# Patient Record
Sex: MALE | Race: WHITE | NOT HISPANIC OR LATINO | Employment: UNEMPLOYED | URBAN - NONMETROPOLITAN AREA
[De-identification: names, ages, dates, MRNs, and addresses within clinical notes are randomized per-mention and may not be internally consistent; named-entity substitution may affect disease eponyms.]

---

## 2019-07-17 ENCOUNTER — OFFICE VISIT (OUTPATIENT)
Dept: UROLOGY | Facility: CLINIC | Age: 69
End: 2019-07-17

## 2019-07-17 VITALS
DIASTOLIC BLOOD PRESSURE: 70 MMHG | SYSTOLIC BLOOD PRESSURE: 130 MMHG | BODY MASS INDEX: 40.56 KG/M2 | HEIGHT: 60 IN | WEIGHT: 206.6 LBS | TEMPERATURE: 97.7 F

## 2019-07-17 DIAGNOSIS — S39.94XA INJURY TO PENIS, INITIAL ENCOUNTER: Primary | ICD-10-CM

## 2019-07-17 DIAGNOSIS — N42.9 DISORDER OF PROSTATE: ICD-10-CM

## 2019-07-17 PROCEDURE — 36415 COLL VENOUS BLD VENIPUNCTURE: CPT | Performed by: UROLOGY

## 2019-07-17 PROCEDURE — 99202 OFFICE O/P NEW SF 15 MIN: CPT | Performed by: UROLOGY

## 2019-07-17 RX ORDER — HYDROCODONE BITARTRATE AND ACETAMINOPHEN 10; 325 MG/1; MG/1
2 TABLET ORAL EVERY 6 HOURS PRN
COMMUNITY

## 2019-07-17 RX ORDER — ASPIRIN 81 MG/1
81 TABLET ORAL DAILY
COMMUNITY

## 2019-07-17 RX ORDER — ALBUTEROL SULFATE 90 UG/1
108 AEROSOL, METERED RESPIRATORY (INHALATION)
COMMUNITY
Start: 2014-05-19

## 2019-07-17 RX ORDER — ALPRAZOLAM 1 MG/1
1 TABLET ORAL EVERY 6 HOURS
COMMUNITY
Start: 2014-07-17

## 2019-07-17 NOTE — PROGRESS NOTES
Chief Complaint:          Penile lesion    HPI:   68 y.o. male.  Pt thinks he may have injury to his penis.  He does not really recall how.  Family hx of prostate cancer.  HPI      Past Medical History:        Past Medical History:   Diagnosis Date   • COPD (chronic obstructive pulmonary disease) (CMS/[facility])    • Coronary artery disease          Current Meds:     Current Outpatient Medications   Medication Sig Dispense Refill   • albuterol sulfate HFA (VENTOLIN HFA) 108 (90 Base) MCG/ACT inhaler 108 mg.     • ALPRAZolam (XANAX) 1 MG tablet Take 1 tablet by mouth Every 6 (Six) Hours.     • aspirin 81 MG EC tablet Take 81 mg by mouth Daily.     • HYDROcodone-acetaminophen (NORCO)  MG per tablet Take 2 tablets by mouth Every 6 (Six) Hours As Needed for Moderate Pain .       No current facility-administered medications for this visit.         Allergies:      No Known Allergies     Past Surgical History:     Past Surgical History:   Procedure Laterality Date   • APPENDECTOMY     • CARDIAC SURGERY           Social History:     Social History     Socioeconomic History   • Marital status:      Spouse name: Not on file   • Number of children: Not on file   • Years of education: Not on file   • Highest education level: Not on file   Tobacco Use   • Smoking status: Former Smoker   • Smokeless tobacco: Current User   Substance and Sexual Activity   • Alcohol use: Yes   • Drug use: No       Family History:     Family History   Problem Relation Age of Onset   • Hypertension Father    • Prostate cancer Father    • Heart disease Mother    • Hypertension Mother        Review of Systems:     Review of Systems   Constitutional: Positive for fatigue.   HENT: Negative for sinus pressure and sinus pain.    Eyes: Negative for pain and redness.   Respiratory: Positive for wheezing. Negative for chest tightness.    Cardiovascular: Negative for chest pain.   Gastrointestinal: Negative for abdominal pain, constipation and diarrhea.    Endocrine: Negative for heat intolerance.   Genitourinary: Negative for frequency.   Musculoskeletal: Negative for back pain.   Skin: Negative for rash.   Allergic/Immunologic: Negative for food allergies.   Neurological: Negative for headaches.   Hematological: Bruises/bleeds easily.         I have reviewed the follow portions of the patient's history and confirmed they are accurate today:  allergies, current medications, past family history, past medical history, past social history, past surgical history, problem list and ROS  Physical Exam:     Physical Exam   Constitutional: He is oriented to person, place, and time.   HENT:   Head: Normocephalic and atraumatic.   Right Ear: External ear normal.   Left Ear: External ear normal.   Nose: Nose normal.   Mouth/Throat: Oropharynx is clear and moist.   Eyes: Conjunctivae and EOM are normal. Pupils are equal, round, and reactive to light.   Neck: Normal range of motion. Neck supple. No thyromegaly present.   Cardiovascular: Normal rate, regular rhythm, normal heart sounds and intact distal pulses.   No murmur heard.  Pulmonary/Chest: Effort normal and breath sounds normal. No respiratory distress. He has no wheezes. He has no rales. He exhibits no tenderness.   Abdominal: Soft. Bowel sounds are normal. He exhibits no distension and no mass. There is no tenderness. No hernia.   Genitourinary: Rectum normal, prostate normal and penis normal.   Genitourinary Comments: On the left side of the glans penis there is a wedge shaped bruise.   Musculoskeletal: Normal range of motion. He exhibits no edema or tenderness.   Lymphadenopathy:     He has no cervical adenopathy.   Neurological: He is alert and oriented to person, place, and time. No cranial nerve deficit. He exhibits normal muscle tone. Coordination normal.   Skin: Skin is warm. No rash noted.   Psychiatric: He has a normal mood and affect. His behavior is normal. Judgment and thought content normal.   Nursing note  "and vitals reviewed.      /70   Temp 97.7 °F (36.5 °C)   Ht (!) 15.2 cm (6\")   Wt 93.7 kg (206 lb 9.6 oz)   BMI 4034.88 kg/m²    Procedure:         Assessment:     Encounter Diagnoses   Name Primary?   • Injury to penis, initial encounter Yes   • Disorder of prostate        Orders Placed This Encounter   Procedures   • PSA DIAGNOSTIC     Standing Status:   Future     Standing Expiration Date:   7/17/2022         Plan:   Will order a psa today and will see pt  Back in 6 weeks and the bruise should resolve      Counseling was given to patient for the following topics impressions as follows: penile bruise. The interim medical history and current results were reviewed.  A treatment plan with follow-up was made for Injury to penis, initial encounter [S39.94XA].   This document has been electronically signed by Clay Roy MD July 17, 2019 4:00 PM      "

## 2019-07-18 LAB — PSA SERPL-MCNC: 0.25 NG/ML (ref 0–4)

## 2019-08-21 ENCOUNTER — OFFICE VISIT (OUTPATIENT)
Dept: UROLOGY | Facility: CLINIC | Age: 69
End: 2019-08-21

## 2019-08-21 VITALS — HEIGHT: 72 IN | WEIGHT: 209.4 LBS | BODY MASS INDEX: 28.36 KG/M2

## 2019-08-21 DIAGNOSIS — S39.94XA INJURY TO PENIS, INITIAL ENCOUNTER: Primary | ICD-10-CM

## 2019-08-21 PROCEDURE — 99213 OFFICE O/P EST LOW 20 MIN: CPT | Performed by: UROLOGY

## 2019-08-21 NOTE — PROGRESS NOTES
Chief Complaint:          Penile bruise.    HPI:   69 y.o. male.  Pt's psa was 0.25  HPI  Pt's penile bruise has resolved.  He is concerned about the blue color at the head of his penisl    Past Medical History:        Past Medical History:   Diagnosis Date   • COPD (chronic obstructive pulmonary disease) (CMS/[facility])    • Coronary artery disease          Current Meds:     Current Outpatient Medications   Medication Sig Dispense Refill   • albuterol sulfate HFA (VENTOLIN HFA) 108 (90 Base) MCG/ACT inhaler 108 mg.     • ALPRAZolam (XANAX) 1 MG tablet Take 1 tablet by mouth Every 6 (Six) Hours.     • aspirin 81 MG EC tablet Take 81 mg by mouth Daily.     • HYDROcodone-acetaminophen (NORCO)  MG per tablet Take 2 tablets by mouth Every 6 (Six) Hours As Needed for Moderate Pain .       No current facility-administered medications for this visit.         Allergies:      No Known Allergies     Past Surgical History:     Past Surgical History:   Procedure Laterality Date   • APPENDECTOMY     • CARDIAC SURGERY           Social History:     Social History     Socioeconomic History   • Marital status:      Spouse name: Not on file   • Number of children: Not on file   • Years of education: Not on file   • Highest education level: Not on file   Tobacco Use   • Smoking status: Former Smoker   • Smokeless tobacco: Current User   Substance and Sexual Activity   • Alcohol use: Yes   • Drug use: No       Family History:     Family History   Problem Relation Age of Onset   • Hypertension Father    • Prostate cancer Father    • Heart disease Mother    • Hypertension Mother        Review of Systems:     Review of Systems   Constitutional: Negative for fatigue.   HENT: Negative for sinus pressure and sinus pain.    Eyes: Negative for pain and redness.   Respiratory: Negative for chest tightness.    Cardiovascular: Negative for chest pain.   Gastrointestinal: Negative for abdominal pain, constipation and diarrhea.   Endocrine:  Negative for heat intolerance.   Genitourinary: Negative for frequency.   Musculoskeletal: Negative for back pain.   Allergic/Immunologic: Negative for food allergies.   Neurological: Negative for headaches.   Hematological: Does not bruise/bleed easily.             I have reviewed the follow portions of the patient's history and confirmed they are accurate today:  allergies, current medications, past family history, past medical history, past social history, past surgical history, problem list and ROS  Physical Exam:     Physical Exam   Constitutional: He is oriented to person, place, and time.   HENT:   Head: Normocephalic and atraumatic.   Right Ear: External ear normal.   Left Ear: External ear normal.   Nose: Nose normal.   Mouth/Throat: Oropharynx is clear and moist.   Eyes: Conjunctivae and EOM are normal. Pupils are equal, round, and reactive to light.   Neck: Normal range of motion. Neck supple. No thyromegaly present.   Cardiovascular: Normal rate, regular rhythm, normal heart sounds and intact distal pulses.   No murmur heard.  Pulmonary/Chest: Effort normal and breath sounds normal. No respiratory distress. He has no wheezes. He has no rales. He exhibits no tenderness.   Abdominal: Soft. Bowel sounds are normal. He exhibits no distension and no mass. There is no tenderness. No hernia.   Genitourinary: Rectum normal, prostate normal and penis normal.   Genitourinary Comments: Pt has slight blueish tint to the left side of corona near meatus.    Musculoskeletal: Normal range of motion. He exhibits no edema or tenderness.   Lymphadenopathy:     He has no cervical adenopathy.   Neurological: He is alert and oriented to person, place, and time. No cranial nerve deficit. He exhibits normal muscle tone. Coordination normal.   Skin: Skin is warm. No rash noted.   Psychiatric: He has a normal mood and affect. His behavior is normal. Judgment and thought content normal.   Nursing note and vitals reviewed.      Rosalba  "182.9 cm (72\")   Wt 95 kg (209 lb 6.4 oz)   BMI 28.40 kg/m²    Procedure:         Assessment:     Encounter Diagnosis   Name Primary?   • Injury to penis, initial encounter Yes       No orders of the defined types were placed in this encounter.      Patient reports that he is not currently experiencing any symptoms of urinary incontinence.      Plan:   Pt could have a vascular malformation that gives his corona a blueish color.  Will observe again in 6 months.    Patient's Body mass index is 28.4 kg/m². BMI is above normal parameters. Recommendations include: educational material.      Smoking Cessation Counseling:  Former smoker.  Patient does not currently use any tobacco products.     Counseling was given to patient for the following topics instructions for management as follows: penile lesion. The interim medical history and current results were reviewed.  A treatment plan with follow-up was made for Injury to penis, initial encounter [S39.94XA].     This document has been electronically signed by Clay Roy MD September 10, 2019 1:35 PM      "

## 2020-07-13 ENCOUNTER — TRANSCRIBE ORDERS (OUTPATIENT)
Dept: INTERNAL MEDICINE | Facility: CLINIC | Age: 70
End: 2020-07-13

## 2020-07-13 ENCOUNTER — HOSPITAL ENCOUNTER (OUTPATIENT)
Dept: GENERAL RADIOLOGY | Facility: HOSPITAL | Age: 70
Discharge: HOME | End: 2020-07-13
Admitting: NURSE PRACTITIONER

## 2020-07-13 DIAGNOSIS — T14.8XXA HEMATOMA: Primary | ICD-10-CM

## 2020-07-13 DIAGNOSIS — T14.8XXA HEMATOMA: ICD-10-CM

## 2020-07-13 PROCEDURE — 99203 OFFICE O/P NEW LOW 30 MIN: CPT | Performed by: NURSE PRACTITIONER

## 2020-07-13 PROCEDURE — 73590 X-RAY EXAM OF LOWER LEG: CPT

## 2020-07-13 PROCEDURE — 73590 X-RAY EXAM OF LOWER LEG: CPT | Performed by: RADIOLOGY

## 2021-02-17 DIAGNOSIS — Z23 IMMUNIZATION DUE: ICD-10-CM

## 2021-11-19 ENCOUNTER — HOSPITAL ENCOUNTER (EMERGENCY)
Facility: HOSPITAL | Age: 71
Discharge: ANOTHER HOSPITAL | End: 2021-11-20
Attending: STUDENT IN AN ORGANIZED HEALTH CARE EDUCATION/TRAINING PROGRAM | Admitting: STUDENT IN AN ORGANIZED HEALTH CARE EDUCATION/TRAINING PROGRAM

## 2021-11-19 ENCOUNTER — APPOINTMENT (OUTPATIENT)
Dept: CT IMAGING | Facility: HOSPITAL | Age: 71
End: 2021-11-19

## 2021-11-19 ENCOUNTER — APPOINTMENT (OUTPATIENT)
Dept: GENERAL RADIOLOGY | Facility: HOSPITAL | Age: 71
End: 2021-11-19

## 2021-11-19 DIAGNOSIS — S01.312A COMPLEX LACERATION OF EAR, LEFT, INITIAL ENCOUNTER: Primary | ICD-10-CM

## 2021-11-19 LAB
ALBUMIN SERPL-MCNC: 4.12 G/DL (ref 3.5–5.2)
ALBUMIN/GLOB SERPL: 1.5 G/DL
ALP SERPL-CCNC: 80 U/L (ref 39–117)
ALT SERPL W P-5'-P-CCNC: 18 U/L (ref 1–41)
ANION GAP SERPL CALCULATED.3IONS-SCNC: 10.9 MMOL/L (ref 5–15)
AST SERPL-CCNC: 22 U/L (ref 1–40)
BASOPHILS # BLD AUTO: 0.04 10*3/MM3 (ref 0–0.2)
BASOPHILS NFR BLD AUTO: 0.7 % (ref 0–1.5)
BILIRUB SERPL-MCNC: 0.2 MG/DL (ref 0–1.2)
BUN SERPL-MCNC: 10 MG/DL (ref 8–23)
BUN/CREAT SERPL: 11.5 (ref 7–25)
CALCIUM SPEC-SCNC: 9.2 MG/DL (ref 8.6–10.5)
CHLORIDE SERPL-SCNC: 104 MMOL/L (ref 98–107)
CO2 SERPL-SCNC: 25.1 MMOL/L (ref 22–29)
CREAT SERPL-MCNC: 0.87 MG/DL (ref 0.76–1.27)
DEPRECATED RDW RBC AUTO: 48.7 FL (ref 37–54)
EOSINOPHIL # BLD AUTO: 0.25 10*3/MM3 (ref 0–0.4)
EOSINOPHIL NFR BLD AUTO: 4.2 % (ref 0.3–6.2)
ERYTHROCYTE [DISTWIDTH] IN BLOOD BY AUTOMATED COUNT: 13.3 % (ref 12.3–15.4)
GFR SERPL CREATININE-BSD FRML MDRD: 86 ML/MIN/1.73
GLOBULIN UR ELPH-MCNC: 2.8 GM/DL
GLUCOSE SERPL-MCNC: 97 MG/DL (ref 65–99)
HCT VFR BLD AUTO: 40.6 % (ref 37.5–51)
HGB BLD-MCNC: 12.8 G/DL (ref 13–17.7)
IMM GRANULOCYTES # BLD AUTO: 0.02 10*3/MM3 (ref 0–0.05)
IMM GRANULOCYTES NFR BLD AUTO: 0.3 % (ref 0–0.5)
INR PPP: 0.92 (ref 0.9–1.1)
LYMPHOCYTES # BLD AUTO: 1.33 10*3/MM3 (ref 0.7–3.1)
LYMPHOCYTES NFR BLD AUTO: 22.4 % (ref 19.6–45.3)
MCH RBC QN AUTO: 31.4 PG (ref 26.6–33)
MCHC RBC AUTO-ENTMCNC: 31.5 G/DL (ref 31.5–35.7)
MCV RBC AUTO: 99.8 FL (ref 79–97)
MONOCYTES # BLD AUTO: 0.63 10*3/MM3 (ref 0.1–0.9)
MONOCYTES NFR BLD AUTO: 10.6 % (ref 5–12)
NEUTROPHILS NFR BLD AUTO: 3.67 10*3/MM3 (ref 1.7–7)
NEUTROPHILS NFR BLD AUTO: 61.8 % (ref 42.7–76)
NRBC BLD AUTO-RTO: 0 /100 WBC (ref 0–0.2)
PLATELET # BLD AUTO: 191 10*3/MM3 (ref 140–450)
PMV BLD AUTO: 11.4 FL (ref 6–12)
POTASSIUM SERPL-SCNC: 3.8 MMOL/L (ref 3.5–5.2)
PROT SERPL-MCNC: 6.9 G/DL (ref 6–8.5)
PROTHROMBIN TIME: 12.8 SECONDS (ref 12.8–14.5)
RBC # BLD AUTO: 4.07 10*6/MM3 (ref 4.14–5.8)
SODIUM SERPL-SCNC: 140 MMOL/L (ref 136–145)
WBC NRBC COR # BLD: 5.94 10*3/MM3 (ref 3.4–10.8)

## 2021-11-19 PROCEDURE — 85610 PROTHROMBIN TIME: CPT | Performed by: STUDENT IN AN ORGANIZED HEALTH CARE EDUCATION/TRAINING PROGRAM

## 2021-11-19 PROCEDURE — 36415 COLL VENOUS BLD VENIPUNCTURE: CPT

## 2021-11-19 PROCEDURE — 99283 EMERGENCY DEPT VISIT LOW MDM: CPT

## 2021-11-19 PROCEDURE — 85025 COMPLETE CBC W/AUTO DIFF WBC: CPT | Performed by: STUDENT IN AN ORGANIZED HEALTH CARE EDUCATION/TRAINING PROGRAM

## 2021-11-19 PROCEDURE — 70450 CT HEAD/BRAIN W/O DYE: CPT

## 2021-11-19 PROCEDURE — 80053 COMPREHEN METABOLIC PANEL: CPT | Performed by: STUDENT IN AN ORGANIZED HEALTH CARE EDUCATION/TRAINING PROGRAM

## 2021-11-19 PROCEDURE — 73562 X-RAY EXAM OF KNEE 3: CPT

## 2021-11-19 RX ORDER — HYDROCODONE BITARTRATE AND ACETAMINOPHEN 5; 325 MG/1; MG/1
1 TABLET ORAL ONCE
Status: DISCONTINUED | OUTPATIENT
Start: 2021-11-19 | End: 2021-11-20 | Stop reason: HOSPADM

## 2021-11-20 VITALS
RESPIRATION RATE: 16 BRPM | TEMPERATURE: 98 F | OXYGEN SATURATION: 98 % | WEIGHT: 206 LBS | BODY MASS INDEX: 27.9 KG/M2 | SYSTOLIC BLOOD PRESSURE: 189 MMHG | HEIGHT: 72 IN | DIASTOLIC BLOOD PRESSURE: 93 MMHG | HEART RATE: 74 BPM

## 2021-11-20 PROCEDURE — 90471 IMMUNIZATION ADMIN: CPT | Performed by: STUDENT IN AN ORGANIZED HEALTH CARE EDUCATION/TRAINING PROGRAM

## 2021-11-20 PROCEDURE — 25010000002 TETANUS-DIPHTH-ACELL PERTUSSIS 5-2.5-18.5 LF-MCG/0.5 SUSPENSION PREFILLED SYRINGE: Performed by: STUDENT IN AN ORGANIZED HEALTH CARE EDUCATION/TRAINING PROGRAM

## 2021-11-20 PROCEDURE — 90715 TDAP VACCINE 7 YRS/> IM: CPT | Performed by: STUDENT IN AN ORGANIZED HEALTH CARE EDUCATION/TRAINING PROGRAM

## 2021-11-20 RX ORDER — AMOXICILLIN AND CLAVULANATE POTASSIUM 875; 125 MG/1; MG/1
1 TABLET, FILM COATED ORAL ONCE
Status: COMPLETED | OUTPATIENT
Start: 2021-11-20 | End: 2021-11-20

## 2021-11-20 RX ADMIN — TETANUS TOXOID, REDUCED DIPHTHERIA TOXOID AND ACELLULAR PERTUSSIS VACCINE, ADSORBED 0.5 ML: 5; 2.5; 8; 8; 2.5 SUSPENSION INTRAMUSCULAR at 00:34

## 2021-11-20 RX ADMIN — AMOXICILLIN AND CLAVULANATE POTASSIUM 1 TABLET: 875; 125 TABLET, FILM COATED ORAL at 00:34

## 2021-11-20 NOTE — ED PROVIDER NOTES
Subjective   71-year-old male presents to the ER after a fall at home.  Patient noted he struck his head on the corner of a bedside table when he was attempting to put his cat back in its cage.  Patient noted immediate pain to the left ear.  Patient also noted left knee pain.  Patient notes considerable left knee pain and swelling after ambulating to the car and transport here.  Denied chest pain or shortness of breath.  Denied headache or vision changes.  Denied focal neurological deficits.  Vitals stable          Review of Systems   HENT: Positive for ear pain.    Musculoskeletal:        Left knee pain   All other systems reviewed and are negative.      Past Medical History:   Diagnosis Date   • COPD (chronic obstructive pulmonary disease) (CMS/HCC)    • Coronary artery disease        No Known Allergies    Past Surgical History:   Procedure Laterality Date   • APPENDECTOMY     • CARDIAC SURGERY         Family History   Problem Relation Age of Onset   • Hypertension Father    • Prostate cancer Father    • Heart disease Mother    • Hypertension Mother        Social History     Socioeconomic History   • Marital status:    Tobacco Use   • Smoking status: Former Smoker   • Smokeless tobacco: Current User   Substance and Sexual Activity   • Alcohol use: Yes   • Drug use: No           Objective   Physical Exam  Constitutional:       General: He is not in acute distress.     Appearance: He is well-developed. He is not ill-appearing.   HENT:      Head: Normocephalic and atraumatic.      Left Ear: Laceration present.      Ears:        Comments: Significant laceration noted to the pinna of the left ear.  Entire length of the superior pinna is involved.  Full-thickness laceration noted.  Laceration extends to within 1 to 2 cm of the external ear canal.  Eyes:      Extraocular Movements: Extraocular movements intact.      Pupils: Pupils are equal, round, and reactive to light.   Neck:      Vascular: No JVD.    Cardiovascular:      Rate and Rhythm: Normal rate and regular rhythm.      Heart sounds: Normal heart sounds. No murmur heard.      Pulmonary:      Effort: No tachypnea, accessory muscle usage or respiratory distress.      Breath sounds: Normal breath sounds. No stridor. No decreased breath sounds, wheezing, rhonchi or rales.   Chest:      Chest wall: No deformity, tenderness or crepitus.   Abdominal:      General: Bowel sounds are normal.      Palpations: Abdomen is soft.      Tenderness: There is no abdominal tenderness. There is no guarding or rebound.   Musculoskeletal:         General: Normal range of motion.      Cervical back: Normal range of motion and neck supple.      Right lower leg: No tenderness. No edema.      Left lower leg: No tenderness. No edema.   Lymphadenopathy:      Cervical: No cervical adenopathy.   Skin:     General: Skin is warm and dry.      Coloration: Skin is not cyanotic.      Findings: No ecchymosis or erythema.   Neurological:      General: No focal deficit present.      Mental Status: He is alert and oriented to person, place, and time.      Cranial Nerves: No cranial nerve deficit.      Motor: No weakness.   Psychiatric:         Mood and Affect: Mood normal. Mood is not anxious.         Behavior: Behavior normal. Behavior is not agitated.         Procedures           ED Course  ED Course as of 11/20/21 0014   Fri Nov 19, 2021   2247 Laceration is significant enough to involve plastics.  [facility] noted they have no beds available.  [facility] noted they had no beds available.  I spoke to [facility] in an attempt to transfer this patient to the ER for evaluation by plastic surgery, patient was initially accepted by the ER attending but he wanted approval from the plastic surgery, I spoke with the plastic surgeon, Dr. Burton, and he expressed the concern for possible ENT involvement.  I spoke with their on-call ENT surgeon, Dr. Coats, and he noted  that he would gladly see the patient in consult but he would not be accepting this patient to his service because this is a plastics concern.  I joseph this with Dr. Burton and he noted that he is not willing and uncomfortable to accept this patient due to concerns for possible ENT involvement.  Transfer status still pending [SF]   2331 Extensive and multiple phone calls have been made to places like [facility], [facility], [facility], [facility], [facility] [place], and other surrounding medical centers with no beds available and no availability for possible ER to ER transfer.  We are currently attempting to contact [facility] in [place] and [facility] in [facility].  We are also attempting to contact [facility] [place] [SF]   7390 We finally received approval for transfer to [facility] [place] to the ER.  Accepting attending will be Dr. Austin.  He was agreeable to transfer plan.  Vital stable transfer [SF]   Sat Nov 20, 2021   0002 Multiple paramedic ribs have been contacted and unable to provide transport for this patient.  Specifically, [facility] service notified our staff that they had trucks available and staff available for transfer.  However, there  felt uncomfortable sending a team to [place] due to concerns for lack of sleep at this point time.  It is likely this patient will need to be transferred to [facility] [place] ER by personal vehicle to receive proper care.  I explained the situation to the patient thoroughly, and he expressed understanding. [SF]      ED Course User Index  [SF] Dc Busch, DO                                           MDM    Final diagnoses:   Complex laceration of ear, left, initial encounter       ED Disposition  ED Disposition     ED Disposition Condition Comment    Transfer to Another Facility             No follow-up provider specified.       Medication List       No changes were made to your prescriptions during this visit.          Dc Busch, DO  11/20/21 0014       Dc Busch, DO  11/20/21 0014

## 2023-04-17 ENCOUNTER — APPOINTMENT (OUTPATIENT)
Dept: GENERAL RADIOLOGY | Facility: HOSPITAL | Age: 73
End: 2023-04-17
Payer: MEDICARE

## 2023-04-17 ENCOUNTER — HOSPITAL ENCOUNTER (EMERGENCY)
Facility: HOSPITAL | Age: 73
Discharge: HOME | End: 2023-04-18
Attending: STUDENT IN AN ORGANIZED HEALTH CARE EDUCATION/TRAINING PROGRAM | Admitting: STUDENT IN AN ORGANIZED HEALTH CARE EDUCATION/TRAINING PROGRAM
Payer: MEDICARE

## 2023-04-17 DIAGNOSIS — J44.1 COPD EXACERBATION: Primary | ICD-10-CM

## 2023-04-17 LAB
A-A DO2: 44.9 MMHG (ref 0–300)
ALBUMIN SERPL-MCNC: 4.3 G/DL (ref 3.5–5.2)
ALBUMIN/GLOB SERPL: 1.4 G/DL
ALP SERPL-CCNC: 75 U/L (ref 39–117)
ALT SERPL W P-5'-P-CCNC: 19 U/L (ref 1–41)
ANION GAP SERPL CALCULATED.3IONS-SCNC: 11.7 MMOL/L (ref 5–15)
ARTERIAL PATENCY WRIST A: ABNORMAL
AST SERPL-CCNC: 22 U/L (ref 1–40)
ATMOSPHERIC PRESS: 723 MMHG
BASE EXCESS BLDA CALC-SCNC: -0.1 MMOL/L (ref 0–2)
BASOPHILS # BLD AUTO: 0.04 10*3/MM3 (ref 0–0.2)
BASOPHILS NFR BLD AUTO: 0.5 % (ref 0–1.5)
BDY SITE: ABNORMAL
BILIRUB SERPL-MCNC: 0.4 MG/DL (ref 0–1.2)
BUN SERPL-MCNC: 8 MG/DL (ref 8–23)
BUN/CREAT SERPL: 7.8 (ref 7–25)
CALCIUM SPEC-SCNC: 9.7 MG/DL (ref 8.6–10.5)
CHLORIDE SERPL-SCNC: 107 MMOL/L (ref 98–107)
CO2 BLDA-SCNC: 26 MMOL/L (ref 22–33)
CO2 SERPL-SCNC: 23.3 MMOL/L (ref 22–29)
COHGB MFR BLD: 1.2 % (ref 0–5)
CREAT SERPL-MCNC: 1.03 MG/DL (ref 0.76–1.27)
CRP SERPL-MCNC: 0.95 MG/DL (ref 0–0.5)
D-LACTATE SERPL-SCNC: 1.7 MMOL/L (ref 0.5–2)
DEPRECATED RDW RBC AUTO: 47.8 FL (ref 37–54)
EGFRCR SERPLBLD CKD-EPI 2021: 76.2 ML/MIN/1.73
EOSINOPHIL # BLD AUTO: 0.36 10*3/MM3 (ref 0–0.4)
EOSINOPHIL NFR BLD AUTO: 4.5 % (ref 0.3–6.2)
ERYTHROCYTE [DISTWIDTH] IN BLOOD BY AUTOMATED COUNT: 12.9 % (ref 12.3–15.4)
FLUAV RNA RESP QL NAA+PROBE: NOT DETECTED
FLUBV RNA RESP QL NAA+PROBE: NOT DETECTED
GEN 5 2HR TROPONIN T REFLEX: 11 NG/L
GLOBULIN UR ELPH-MCNC: 3 GM/DL
GLUCOSE SERPL-MCNC: 104 MG/DL (ref 65–99)
HCO3 BLDA-SCNC: 24.8 MMOL/L (ref 20–26)
HCT VFR BLD AUTO: 42.9 % (ref 37.5–51)
HCT VFR BLD CALC: 41.8 % (ref 38–51)
HGB BLD-MCNC: 14 G/DL (ref 13–17.7)
HGB BLDA-MCNC: 13.6 G/DL (ref 14–18)
HOLD SPECIMEN: NORMAL
HOLD SPECIMEN: NORMAL
IMM GRANULOCYTES # BLD AUTO: 0.02 10*3/MM3 (ref 0–0.05)
IMM GRANULOCYTES NFR BLD AUTO: 0.2 % (ref 0–0.5)
INHALED O2 CONCENTRATION: 21 %
LYMPHOCYTES # BLD AUTO: 1.2 10*3/MM3 (ref 0.7–3.1)
LYMPHOCYTES NFR BLD AUTO: 14.9 % (ref 19.6–45.3)
Lab: ABNORMAL
Lab: ABNORMAL
MCH RBC QN AUTO: 32.6 PG (ref 26.6–33)
MCHC RBC AUTO-ENTMCNC: 32.6 G/DL (ref 31.5–35.7)
MCV RBC AUTO: 100 FL (ref 79–97)
METHGB BLD QL: 0.1 % (ref 0–3)
MODALITY: ABNORMAL
MONOCYTES # BLD AUTO: 0.56 10*3/MM3 (ref 0.1–0.9)
MONOCYTES NFR BLD AUTO: 7 % (ref 5–12)
NEUTROPHILS NFR BLD AUTO: 5.86 10*3/MM3 (ref 1.7–7)
NEUTROPHILS NFR BLD AUTO: 72.9 % (ref 42.7–76)
NOTE: ABNORMAL
NOTIFIED BY: ABNORMAL
NOTIFIED WHO: ABNORMAL
NRBC BLD AUTO-RTO: 0 /100 WBC (ref 0–0.2)
NT-PROBNP SERPL-MCNC: 316.1 PG/ML (ref 0–900)
OXYHGB MFR BLDV: 85.6 % (ref 94–99)
PCO2 BLDA: 40.1 MM HG (ref 35–45)
PCO2 TEMP ADJ BLD: ABNORMAL MM[HG]
PH BLDA: 7.4 PH UNITS (ref 7.35–7.45)
PH, TEMP CORRECTED: ABNORMAL
PLATELET # BLD AUTO: 177 10*3/MM3 (ref 140–450)
PMV BLD AUTO: 11 FL (ref 6–12)
PO2 BLDA: 51.7 MM HG (ref 83–108)
PO2 TEMP ADJ BLD: ABNORMAL MM[HG]
POTASSIUM SERPL-SCNC: 3.5 MMOL/L (ref 3.5–5.2)
PROT SERPL-MCNC: 7.3 G/DL (ref 6–8.5)
QT INTERVAL: 440 MS
QTC INTERVAL: 467 MS
RBC # BLD AUTO: 4.29 10*6/MM3 (ref 4.14–5.8)
SAO2 % BLDCOA: 86.7 % (ref 94–99)
SARS-COV-2 RNA RESP QL NAA+PROBE: NOT DETECTED
SODIUM SERPL-SCNC: 142 MMOL/L (ref 136–145)
TROPONIN T DELTA: 1 NG/L
TROPONIN T SERPL HS-MCNC: 10 NG/L
VENTILATOR MODE: ABNORMAL
WBC NRBC COR # BLD: 8.04 10*3/MM3 (ref 3.4–10.8)
WHOLE BLOOD HOLD COAG: NORMAL
WHOLE BLOOD HOLD SPECIMEN: NORMAL

## 2023-04-17 PROCEDURE — 25010000002 DEXAMETHASONE SODIUM PHOSPHATE 10 MG/ML SOLUTION: Performed by: STUDENT IN AN ORGANIZED HEALTH CARE EDUCATION/TRAINING PROGRAM

## 2023-04-17 PROCEDURE — 36415 COLL VENOUS BLD VENIPUNCTURE: CPT

## 2023-04-17 PROCEDURE — 84484 ASSAY OF TROPONIN QUANT: CPT | Performed by: PHYSICIAN ASSISTANT

## 2023-04-17 PROCEDURE — 80053 COMPREHEN METABOLIC PANEL: CPT | Performed by: PHYSICIAN ASSISTANT

## 2023-04-17 PROCEDURE — 87636 SARSCOV2 & INF A&B AMP PRB: CPT | Performed by: PHYSICIAN ASSISTANT

## 2023-04-17 PROCEDURE — 96374 THER/PROPH/DIAG INJ IV PUSH: CPT

## 2023-04-17 PROCEDURE — 94640 AIRWAY INHALATION TREATMENT: CPT

## 2023-04-17 PROCEDURE — 85025 COMPLETE CBC W/AUTO DIFF WBC: CPT | Performed by: PHYSICIAN ASSISTANT

## 2023-04-17 PROCEDURE — 83880 ASSAY OF NATRIURETIC PEPTIDE: CPT | Performed by: PHYSICIAN ASSISTANT

## 2023-04-17 PROCEDURE — 83605 ASSAY OF LACTIC ACID: CPT | Performed by: PHYSICIAN ASSISTANT

## 2023-04-17 PROCEDURE — 93005 ELECTROCARDIOGRAM TRACING: CPT | Performed by: PHYSICIAN ASSISTANT

## 2023-04-17 PROCEDURE — 82805 BLOOD GASES W/O2 SATURATION: CPT

## 2023-04-17 PROCEDURE — 82375 ASSAY CARBOXYHB QUANT: CPT

## 2023-04-17 PROCEDURE — 87040 BLOOD CULTURE FOR BACTERIA: CPT | Performed by: PHYSICIAN ASSISTANT

## 2023-04-17 PROCEDURE — 83050 HGB METHEMOGLOBIN QUAN: CPT

## 2023-04-17 PROCEDURE — 71045 X-RAY EXAM CHEST 1 VIEW: CPT

## 2023-04-17 PROCEDURE — C9803 HOPD COVID-19 SPEC COLLECT: HCPCS | Performed by: PHYSICIAN ASSISTANT

## 2023-04-17 PROCEDURE — 86140 C-REACTIVE PROTEIN: CPT | Performed by: PHYSICIAN ASSISTANT

## 2023-04-17 PROCEDURE — 99284 EMERGENCY DEPT VISIT MOD MDM: CPT

## 2023-04-17 PROCEDURE — 36600 WITHDRAWAL OF ARTERIAL BLOOD: CPT

## 2023-04-17 PROCEDURE — 94799 UNLISTED PULMONARY SVC/PX: CPT

## 2023-04-17 RX ORDER — AMOXICILLIN AND CLAVULANATE POTASSIUM 875; 125 MG/1; MG/1
1 TABLET, FILM COATED ORAL 2 TIMES DAILY
Qty: 14 TABLET | Refills: 0 | Status: SHIPPED | OUTPATIENT
Start: 2023-04-17 | End: 2023-04-24

## 2023-04-17 RX ORDER — DOXYCYCLINE 100 MG/1
100 CAPSULE ORAL 2 TIMES DAILY
Qty: 14 CAPSULE | Refills: 0 | Status: SHIPPED | OUTPATIENT
Start: 2023-04-17 | End: 2023-04-24

## 2023-04-17 RX ORDER — DOXYCYCLINE 100 MG/1
100 CAPSULE ORAL ONCE
Status: COMPLETED | OUTPATIENT
Start: 2023-04-17 | End: 2023-04-17

## 2023-04-17 RX ORDER — IPRATROPIUM BROMIDE AND ALBUTEROL SULFATE 2.5; .5 MG/3ML; MG/3ML
3 SOLUTION RESPIRATORY (INHALATION) ONCE
Status: COMPLETED | OUTPATIENT
Start: 2023-04-17 | End: 2023-04-17

## 2023-04-17 RX ORDER — AMOXICILLIN AND CLAVULANATE POTASSIUM 875; 125 MG/1; MG/1
1 TABLET, FILM COATED ORAL ONCE
Status: COMPLETED | OUTPATIENT
Start: 2023-04-17 | End: 2023-04-17

## 2023-04-17 RX ORDER — PREDNISONE 50 MG/1
50 TABLET ORAL DAILY
Qty: 5 TABLET | Refills: 0 | Status: SHIPPED | OUTPATIENT
Start: 2023-04-17

## 2023-04-17 RX ORDER — DEXAMETHASONE SODIUM PHOSPHATE 10 MG/ML
10 INJECTION, SOLUTION INTRAMUSCULAR; INTRAVENOUS ONCE
Status: COMPLETED | OUTPATIENT
Start: 2023-04-17 | End: 2023-04-17

## 2023-04-17 RX ADMIN — IPRATROPIUM BROMIDE AND ALBUTEROL SULFATE 3 ML: .5; 2.5 SOLUTION RESPIRATORY (INHALATION) at 22:08

## 2023-04-17 RX ADMIN — AMOXICILLIN AND CLAVULANATE POTASSIUM 1 TABLET: 875; 125 TABLET, FILM COATED ORAL at 22:23

## 2023-04-17 RX ADMIN — DEXAMETHASONE SODIUM PHOSPHATE 10 MG: 10 INJECTION INTRAMUSCULAR; INTRAVENOUS at 21:45

## 2023-04-17 RX ADMIN — DOXYCYCLINE 100 MG: 100 CAPSULE ORAL at 22:24

## 2023-04-18 VITALS
TEMPERATURE: 97.1 F | OXYGEN SATURATION: 93 % | DIASTOLIC BLOOD PRESSURE: 83 MMHG | WEIGHT: 206 LBS | RESPIRATION RATE: 18 BRPM | SYSTOLIC BLOOD PRESSURE: 142 MMHG | HEART RATE: 68 BPM | HEIGHT: 72 IN | BODY MASS INDEX: 27.9 KG/M2

## 2023-04-18 NOTE — DISCHARGE INSTRUCTIONS
Follow up with your primary care physician in 2-3 days for further evaluation. Take steroids daily for 5 days. Take antibiotics as prescribed. Take 2 puffs every 6 hours for the next 2 days and then use as needed. Return immediately to emergency department if symptoms return.

## 2023-04-18 NOTE — ED PROVIDER NOTES
Subjective   History of Present Illness     Willie is a 71 yo w/ PMH for COPD, CAD presenting to the ED for dyspena which started 2-3 night prior. Patient reports symptoms worsened today. Patient also reports clear productive cough. Increased congestion, started taking mucinex for relief in symptoms. Denies fevers. No abdominal pain, bowel changes or urinary sx. No Syncope, lightheadedness or dizziness. No LE swelling/pain. No fluid retention.     Review of Systems   Constitutional: Negative for activity change and fatigue.   HENT: Negative for congestion and sinus pressure.    Respiratory: Positive for cough and shortness of breath.    Cardiovascular: Negative for chest pain.   Gastrointestinal: Negative for abdominal distention and abdominal pain.   Genitourinary: Negative for difficulty urinating, dysuria and hematuria.   Musculoskeletal: Negative for arthralgias.   Skin: Negative for color change and rash.   Neurological: Negative for dizziness, weakness and light-headedness.   All other systems reviewed and are negative.      Past Medical History:   Diagnosis Date   • COPD (chronic obstructive pulmonary disease)    • Coronary artery disease        No Known Allergies    Past Surgical History:   Procedure Laterality Date   • APPENDECTOMY     • CARDIAC SURGERY         Family History   Problem Relation Age of Onset   • Hypertension Father    • Prostate cancer Father    • Heart disease Mother    • Hypertension Mother        Social History     Socioeconomic History   • Marital status:    Tobacco Use   • Smoking status: Former   • Smokeless tobacco: Current   Substance and Sexual Activity   • Alcohol use: Yes   • Drug use: No           Objective   Physical Exam  Constitutional:       General: He is not in acute distress.     Appearance: Normal appearance. He is not ill-appearing.   HENT:      Head: Normocephalic and atraumatic.      Nose: No congestion or rhinorrhea.   Eyes:      Extraocular Movements:  Extraocular movements intact.      Pupils: Pupils are equal, round, and reactive to light.   Cardiovascular:      Rate and Rhythm: Normal rate and regular rhythm.   Pulmonary:      Effort: Pulmonary effort is normal.      Breath sounds: Decreased breath sounds present.   Abdominal:      General: Bowel sounds are normal.      Palpations: Abdomen is soft.   Musculoskeletal:         General: Normal range of motion.      Cervical back: Normal range of motion.   Skin:     General: Skin is warm.      Capillary Refill: Capillary refill takes less than 2 seconds.   Neurological:      General: No focal deficit present.      Mental Status: He is alert and oriented to person, place, and time.      Cranial Nerves: No cranial nerve deficit.      Sensory: No sensory deficit.      Motor: No weakness.      Deep Tendon Reflexes: Reflexes normal.         Procedures           ED Course  ED Course as of 04/18/23 0205   Mon Apr 17, 2023   2311 Sinus w/ PVC. HR 68. . . No stemi although diffuse artifcact. Electronically signed by Rema Cooper MD, 04/17/23, 11:11 PM EDT.  [LS]      ED Course User Index  [LS] Rema Cooper MD                                           Medical Decision Making  Mr. Tapia is a 73 yo presenting to the ED for cough and congestion. Patient is satting initially 89% on RA. Patient is placed on 2L NC with improvement in oxygenation. ABG shows hypoxia, normal PH and normal CO2. Bedside ECG shows no acute signs of ischemia. Bedside CXR shows normal cardiopulmonary silhouette no acute changes or signs of pneumonia. Basic labs, BNP, blood cultures, COVID/flu, Trop, obtained for further evaluation results are non actionable.  Patient likely has COPD exacerbation 2/2 to viral pneumonia. Patient is given abx, dunoeb and steroids. Patient is weaned off oxygen and is currently 92-96% on RA. Patient feels improved. Patient discharged and informed to fu w/ his pcp in 2-3 days and to return to ED  if symptoms worsen. Patient. Will continue mucinex to help w/ congestion PRN.    COPD exacerbation: acute illness or injury  Amount and/or Complexity of Data Reviewed  Labs: ordered.  Radiology: ordered.  ECG/medicine tests: ordered.      Risk  Prescription drug management.          Final diagnoses:   COPD exacerbation       ED Disposition  ED Disposition     ED Disposition   Discharge    Condition   Stable    Comment   --             Francisco Cox MD  [address] [id number]  [phone]    Go in 2 days  Medical evaluation         Medication List      New Prescriptions    amoxicillin-clavulanate 875-125 MG per tablet  Commonly known as: AUGMENTIN  Take 1 tablet by mouth 2 (Two) Times a Day for 7 days.     doxycycline 100 MG capsule  Commonly known as: MONODOX  Take 1 capsule by mouth 2 (Two) Times a Day for 7 days.     predniSONE 50 MG tablet  Commonly known as: DELTASONE  Take 1 tablet by mouth Daily.           Where to Get Your Medications      These medications were sent to [facility] - [place], [place] - [address] - [phone] PH - [phone] FX  [address] [address]    Phone: [phone]   · amoxicillin-clavulanate 875-125 MG per tablet  · doxycycline 100 MG capsule  · predniSONE 50 MG tablet          Rema Cooper MD  04/18/23 4144

## 2023-04-18 NOTE — ED NOTES
MEDICAL SCREENING:    Reason for Visit: cough, SOB     Patient initially seen in triage.  The patient was advised further evaluation and diagnostic testing will be needed, some of the treatment and testing will be initiated in the lobby in order to begin the process.  The patient will be returned to the waiting area for the time being and possibly be re-assessed by a subsequent ED provider.  The patient will be brought back to the treatment area in as timely manner as possible.       Nory Munoz PA  04/17/23 2018       Nory Munoz PA  04/17/23 2019

## 2023-04-22 LAB
BACTERIA SPEC AEROBE CULT: NORMAL
BACTERIA SPEC AEROBE CULT: NORMAL

## 2023-08-02 ENCOUNTER — OFFICE VISIT (OUTPATIENT)
Dept: CARDIOLOGY | Facility: CLINIC | Age: 73
End: 2023-08-02
Payer: MEDICARE

## 2023-08-02 VITALS
BODY MASS INDEX: 28.36 KG/M2 | DIASTOLIC BLOOD PRESSURE: 75 MMHG | OXYGEN SATURATION: 96 % | HEIGHT: 72 IN | HEART RATE: 51 BPM | WEIGHT: 209.4 LBS | SYSTOLIC BLOOD PRESSURE: 147 MMHG

## 2023-08-02 DIAGNOSIS — R00.1 BRADYCARDIA, SINUS: ICD-10-CM

## 2023-08-02 DIAGNOSIS — I25.708 ATHEROSCLEROSIS OF CABG W OTH ANGINA PECTORIS: ICD-10-CM

## 2023-08-02 DIAGNOSIS — I10 PRIMARY HYPERTENSION: ICD-10-CM

## 2023-08-02 DIAGNOSIS — E78.2 MIXED HYPERLIPIDEMIA: ICD-10-CM

## 2023-08-02 DIAGNOSIS — I20.8 CHRONIC STABLE ANGINA: Primary | ICD-10-CM

## 2023-08-02 PROBLEM — I20.89 CHRONIC STABLE ANGINA: Status: ACTIVE | Noted: 2023-08-02

## 2023-08-02 PROCEDURE — 3077F SYST BP >= 140 MM HG: CPT | Performed by: INTERNAL MEDICINE

## 2023-08-02 PROCEDURE — 99204 OFFICE O/P NEW MOD 45 MIN: CPT | Performed by: INTERNAL MEDICINE

## 2023-08-02 PROCEDURE — 3078F DIAST BP <80 MM HG: CPT | Performed by: INTERNAL MEDICINE

## 2023-08-02 RX ORDER — LISINOPRIL 10 MG/1
10 TABLET ORAL DAILY
COMMUNITY

## 2023-08-02 RX ORDER — ISOSORBIDE DINITRATE 40 MG/1
40 TABLET ORAL 3 TIMES DAILY
COMMUNITY

## 2023-08-02 RX ORDER — ATORVASTATIN CALCIUM 10 MG/1
10 TABLET, FILM COATED ORAL DAILY
COMMUNITY

## 2023-08-04 ENCOUNTER — PATIENT ROUNDING (BHMG ONLY) (OUTPATIENT)
Dept: CARDIOLOGY | Facility: CLINIC | Age: 73
End: 2023-08-04
Payer: MEDICARE

## 2023-08-10 DIAGNOSIS — R07.9 CHEST PAIN, UNSPECIFIED TYPE: Primary | ICD-10-CM

## 2023-09-13 ENCOUNTER — HOSPITAL ENCOUNTER (OUTPATIENT)
Dept: CARDIOLOGY | Facility: HOSPITAL | Age: 73
Discharge: HOME | End: 2023-09-13
Payer: MEDICARE

## 2023-09-13 DIAGNOSIS — R07.9 CHEST PAIN, UNSPECIFIED TYPE: ICD-10-CM

## 2023-09-13 LAB
BH CV STRESS BP STAGE 1: NORMAL
BH CV STRESS DURATION MIN STAGE 1: 1
BH CV STRESS DURATION SEC STAGE 1: 47
BH CV STRESS GRADE STAGE 1: 10
BH CV STRESS HR STAGE 1: 93
BH CV STRESS METS STAGE 1: 5
BH CV STRESS PROTOCOL 1: NORMAL
BH CV STRESS RECOVERY BP: NORMAL MMHG
BH CV STRESS RECOVERY HR: 50 BPM
BH CV STRESS SPEED STAGE 1: 1.7
BH CV STRESS STAGE 1: 1
MAXIMAL PREDICTED HEART RATE: 145 BPM
PERCENT MAX PREDICTED HR: 64.14 %
STRESS BASELINE BP: NORMAL MMHG
STRESS BASELINE HR: 49 BPM
STRESS PERCENT HR: 75 %
STRESS POST ESTIMATED WORKLOAD: 4.6 METS
STRESS POST EXERCISE DUR MIN: 1 MIN
STRESS POST EXERCISE DUR SEC: 47 SEC
STRESS POST PEAK BP: NORMAL MMHG
STRESS POST PEAK HR: 93 BPM
STRESS TARGET HR: 123 BPM

## 2023-09-13 PROCEDURE — 93017 CV STRESS TEST TRACING ONLY: CPT

## 2023-09-13 PROCEDURE — 93306 TTE W/DOPPLER COMPLETE: CPT

## 2023-09-18 LAB
BH CV ECHO MEAS - ACS: 1.2 CM
BH CV ECHO MEAS - AO MAX PG: 21.3 MMHG
BH CV ECHO MEAS - AO MEAN PG: 11 MMHG
BH CV ECHO MEAS - AO ROOT DIAM: 3.9 CM
BH CV ECHO MEAS - AO V2 MAX: 231 CM/SEC
BH CV ECHO MEAS - AO V2 VTI: 55.8 CM
BH CV ECHO MEAS - AVA(I,D): 2.04 CM2
BH CV ECHO MEAS - EDV(CUBED): 122 ML
BH CV ECHO MEAS - EDV(MOD-SP4): 122 ML
BH CV ECHO MEAS - EF(MOD-SP4): 74.3 %
BH CV ECHO MEAS - ESV(CUBED): 86.9 ML
BH CV ECHO MEAS - ESV(MOD-SP4): 31.4 ML
BH CV ECHO MEAS - FS: 10.7 %
BH CV ECHO MEAS - IVS/LVPW: 1.16 CM
BH CV ECHO MEAS - IVSD: 1.39 CM
BH CV ECHO MEAS - LA DIMENSION: 3.5 CM
BH CV ECHO MEAS - LAT PEAK E' VEL: 10 CM/SEC
BH CV ECHO MEAS - LV DIASTOLIC VOL/BSA (35-75): 56.2 CM2
BH CV ECHO MEAS - LV MASS(C)D: 257.1 GRAMS
BH CV ECHO MEAS - LV MAX PG: 5.1 MMHG
BH CV ECHO MEAS - LV MEAN PG: 2 MMHG
BH CV ECHO MEAS - LV SYSTOLIC VOL/BSA (12-30): 14.5 CM2
BH CV ECHO MEAS - LV V1 MAX: 113 CM/SEC
BH CV ECHO MEAS - LV V1 VTI: 27.4 CM
BH CV ECHO MEAS - LVIDD: 5 CM
BH CV ECHO MEAS - LVIDS: 4.4 CM
BH CV ECHO MEAS - LVOT AREA: 4.2 CM2
BH CV ECHO MEAS - LVOT DIAM: 2.3 CM
BH CV ECHO MEAS - LVPWD: 1.2 CM
BH CV ECHO MEAS - MED PEAK E' VEL: 6.4 CM/SEC
BH CV ECHO MEAS - MV A MAX VEL: 65.5 CM/SEC
BH CV ECHO MEAS - MV DEC SLOPE: 282 CM/SEC2
BH CV ECHO MEAS - MV E MAX VEL: 107 CM/SEC
BH CV ECHO MEAS - MV E/A: 1.63
BH CV ECHO MEAS - MV MAX PG: 5.3 MMHG
BH CV ECHO MEAS - MV MEAN PG: 1 MMHG
BH CV ECHO MEAS - MV P1/2T: 121.5 MSEC
BH CV ECHO MEAS - MV V2 VTI: 56.2 CM
BH CV ECHO MEAS - MVA(P1/2T): 1.81 CM2
BH CV ECHO MEAS - MVA(VTI): 2.03 CM2
BH CV ECHO MEAS - PA ACC TIME: 0.13 SEC
BH CV ECHO MEAS - RAP SYSTOLE: 10 MMHG
BH CV ECHO MEAS - RVSP: 39.6 MMHG
BH CV ECHO MEAS - SI(MOD-SP4): 41.7 ML/M2
BH CV ECHO MEAS - SV(LVOT): 113.8 ML
BH CV ECHO MEAS - SV(MOD-SP4): 90.6 ML
BH CV ECHO MEAS - TAPSE (>1.6): 1.75 CM
BH CV ECHO MEAS - TR MAX PG: 29.6 MMHG
BH CV ECHO MEAS - TR MAX VEL: 272 CM/SEC
BH CV ECHO MEASUREMENTS AVERAGE E/E' RATIO: 13.05
LEFT ATRIUM VOLUME INDEX: 25.3 ML/M2

## 2023-09-21 ENCOUNTER — TELEPHONE (OUTPATIENT)
Dept: CARDIOLOGY | Facility: CLINIC | Age: 73
End: 2023-09-21
Payer: MEDICARE

## 2023-09-21 NOTE — TELEPHONE ENCOUNTER
Spoke to patient about test results per Shanna's request. Patient is understanding of normal stress test results and that the echo would need to be repeated in 12-18 months. Patient is aware of follow up in November 2023.----- Message from DOREEN Morfin sent at 9/20/2023  4:43 PM EDT -----  Please call patient about their normal result.    Treadmill stress test was normal.  There is no evidence of any blockages.  Echocardiogram showed normal pumping function of the heart and some mild aortic valve stenosis.  We will just need to keep an eye on the latter and repeat echocardiogram in 12 to 18 months.  Need to be careful    Thanks, Shanna

## 2023-11-28 ENCOUNTER — OFFICE VISIT (OUTPATIENT)
Dept: CARDIOLOGY | Facility: CLINIC | Age: 73
End: 2023-11-28
Payer: MEDICARE

## 2023-11-28 VITALS
HEART RATE: 62 BPM | BODY MASS INDEX: 29.17 KG/M2 | SYSTOLIC BLOOD PRESSURE: 157 MMHG | DIASTOLIC BLOOD PRESSURE: 78 MMHG | OXYGEN SATURATION: 98 % | HEIGHT: 72 IN | WEIGHT: 215.4 LBS

## 2023-11-28 DIAGNOSIS — E78.2 MIXED HYPERLIPIDEMIA: ICD-10-CM

## 2023-11-28 DIAGNOSIS — I10 PRIMARY HYPERTENSION: ICD-10-CM

## 2023-11-28 DIAGNOSIS — I20.89 CHRONIC STABLE ANGINA: ICD-10-CM

## 2023-11-28 DIAGNOSIS — I25.708 ATHEROSCLEROSIS OF CABG W OTH ANGINA PECTORIS: Primary | ICD-10-CM

## 2023-11-28 DIAGNOSIS — R00.1 BRADYCARDIA, SINUS: ICD-10-CM

## 2023-11-28 PROCEDURE — 99213 OFFICE O/P EST LOW 20 MIN: CPT | Performed by: INTERNAL MEDICINE

## 2023-11-28 PROCEDURE — 3077F SYST BP >= 140 MM HG: CPT | Performed by: INTERNAL MEDICINE

## 2023-11-28 PROCEDURE — 3078F DIAST BP <80 MM HG: CPT | Performed by: INTERNAL MEDICINE

## 2023-11-28 RX ORDER — LISINOPRIL 20 MG/1
20 TABLET ORAL DAILY
Qty: 90 TABLET | Refills: 3 | Status: SHIPPED | OUTPATIENT
Start: 2023-11-28

## 2023-11-28 NOTE — PROGRESS NOTES
[facility] CARDIOLOGY  [address] [facility] [place] [address]  Phone: [phone]  Fax: [phone]    11/28/2023    Chief Complaint   Patient presents with    Follow-up     Intermittent right CP,SOA on exertion,chronic fatigue  Some leg cramps    Med Review     Tolerating all current medications.        History:     Willie Cadet is a 73 y.o. male presenting for  follow-up evaluation   Clinically stable from cardiac standpoint   Symptoms:  CP typical  SOB stable    Orthopnea No  Lower extremity edema no   Palpitations no   Compliant with medications yes  Claudication yes      Past Medical History:   Diagnosis Date    COPD (chronic obstructive pulmonary disease)     Coronary artery disease     Primary hypertension 8/2/2023       Past Surgical History:   Procedure Laterality Date    APPENDECTOMY      CARDIAC SURGERY          Past Social History:  Social History     Socioeconomic History    Marital status:    Tobacco Use    Smoking status: Former    Smokeless tobacco: Current     Types: Chew   Vaping Use    Vaping Use: Never used   Substance and Sexual Activity    Alcohol use: Yes    Drug use: No    Sexual activity: Defer       Past Family History:  Family History   Problem Relation Age of Onset    Hypertension Father     Prostate cancer Father     Heart disease Mother     Hypertension Mother        Review of Systems:   ROS       Current Outpatient Medications   Medication Sig Dispense Refill    albuterol sulfate  (90 Base) MCG/ACT inhaler 1,200 puffs.      ALPRAZolam (XANAX) 1 MG tablet Take 1 tablet by mouth Every 6 (Six) Hours.      aspirin 81 MG EC tablet Take 1 tablet by mouth Daily.      atorvastatin (LIPITOR) 10 MG tablet Take 1 tablet by mouth Daily.      HYDROcodone-acetaminophen (NORCO)  MG per tablet Take 2 tablets by mouth Every 6 (Six) Hours As Needed for Moderate Pain.      isosorbide dinitrate (ISORDIL) 40 MG tablet Take 1 tablet by mouth 3 (Three) Times  "a Day.      lisinopril (PRINIVIL,ZESTRIL) 20 MG tablet Take 1 tablet by mouth Daily. 90 tablet 3    predniSONE (DELTASONE) 50 MG tablet Take 1 tablet by mouth Daily. (Patient not taking: Reported on 8/2/2023) 5 tablet 0     No current facility-administered medications for this visit.        No Known Allergies    Objective     /78   Pulse 62   Ht 182.9 cm (72\")   Wt 97.7 kg (215 lb 6.4 oz)   SpO2 98%   BMI 29.21 kg/m²     Physical Exam       DATA:   Results for orders placed in visit on 08/02/23    Adult Transthoracic Echo Complete w/ Color, Spectral and Contrast if necessary per protocol    Interpretation Summary    Left ventricular systolic function is normal. Left ventricular ejection fraction appears to be 61 - 65%.    Left ventricular wall thickness is consistent with mild concentric hypertrophy.    Left ventricular diastolic function was normal.    Mild aortic valve stenosis is present.    Estimated right ventricular systolic pressure from tricuspid regurgitation is mildly elevated (35-45 mmHg).    Mild pulmonary hypertension is present.    There is no evidence of pericardial effusion   Results for orders placed during the hospital encounter of 09/13/23    Treadmill Stress Test    Interpretation Summary    Blood pressure demonstrated a hypertensive response to stress.    No ECG evidence of myocardial ischemia.    Negative clinical evidence of myocardial ischemia.    Findings consistent with a normal ECG stress test.   Results for orders placed during the hospital encounter of 09/13/23    Treadmill Stress Test    Interpretation Summary    Blood pressure demonstrated a hypertensive response to stress.    No ECG evidence of myocardial ischemia.    Negative clinical evidence of myocardial ischemia.    Findings consistent with a normal ECG stress test.     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 Feel free to contact me directly with any further questions or concerns.          Adria Parker MD, FACC  Interventional Cardiology    "